# Patient Record
Sex: FEMALE | Race: WHITE | NOT HISPANIC OR LATINO
[De-identification: names, ages, dates, MRNs, and addresses within clinical notes are randomized per-mention and may not be internally consistent; named-entity substitution may affect disease eponyms.]

---

## 2023-07-05 ENCOUNTER — DASHBOARD ENCOUNTERS (OUTPATIENT)
Age: 43
End: 2023-07-05

## 2023-07-05 ENCOUNTER — WEB ENCOUNTER (OUTPATIENT)
Dept: URBAN - METROPOLITAN AREA CLINIC 9 | Facility: CLINIC | Age: 43
End: 2023-07-05

## 2023-07-05 ENCOUNTER — TELEPHONE ENCOUNTER (OUTPATIENT)
Dept: URBAN - METROPOLITAN AREA CLINIC 9 | Facility: CLINIC | Age: 43
End: 2023-07-05

## 2023-07-05 ENCOUNTER — OFFICE VISIT (OUTPATIENT)
Dept: URBAN - METROPOLITAN AREA CLINIC 9 | Facility: CLINIC | Age: 43
End: 2023-07-05
Payer: COMMERCIAL

## 2023-07-05 VITALS
BODY MASS INDEX: 27.6 KG/M2 | HEIGHT: 62 IN | WEIGHT: 150 LBS | DIASTOLIC BLOOD PRESSURE: 70 MMHG | SYSTOLIC BLOOD PRESSURE: 110 MMHG

## 2023-07-05 DIAGNOSIS — K50.90 CROHN'S DISEASE, UNSPECIFIED, WITHOUT COMPLICATIONS: ICD-10-CM

## 2023-07-05 PROCEDURE — 99204 OFFICE O/P NEW MOD 45 MIN: CPT | Performed by: INTERNAL MEDICINE

## 2023-07-05 RX ORDER — SODIUM, POTASSIUM,MAG SULFATES 17.5-3.13G
177ML SOLUTION, RECONSTITUTED, ORAL ORAL
Qty: 1 | OUTPATIENT
Start: 2023-06-26 | End: 2023-06-28

## 2023-07-05 NOTE — HPI-TODAY'S VISIT:
42 year old female with hx of crohns here to establish care. Normal recent labs 6/2023 and normal ct scan 8/2022 She is here to establish care she moved from [place].  She says she was diagnosed with what sounds like ileal Crohn's in 2007 with abdominal pain diarrhea blood and weight loss.  She had a colonoscopy at that time.  She was put on Remicade she says as well as other meds.  But she had kind of recurrent partial small bowel obstructions and eventually had a small bowel obstruction requiring ileocolonic resection in May 2009.  Then she has been on no meds since that time.  She has not been very compliant.  She also has overlap IBS-D with intermittent loose stools about 3 times a week and abdominal pain associated with that and the other days she has normal stool.  She has been gaining weight she is never really had blood is the main symptom.  Her grandmother did have colon cancer in her half sister does have Crohn's.  Sounds like the only biologic she was exposed to was Remicade.  She has had scopes since her resection in 2009 but she does not remember the last 1

## 2023-07-06 ENCOUNTER — WEB ENCOUNTER (OUTPATIENT)
Dept: URBAN - METROPOLITAN AREA CLINIC 9 | Facility: CLINIC | Age: 43
End: 2023-07-06

## 2023-07-07 ENCOUNTER — WEB ENCOUNTER (OUTPATIENT)
Dept: URBAN - METROPOLITAN AREA CLINIC 9 | Facility: CLINIC | Age: 43
End: 2023-07-07

## 2023-07-07 RX ORDER — SOD SULF/POT CHLORIDE/MAG SULF 1.479 G
AS DIRECTED TABLET ORAL 1
Qty: 1 | Refills: 0 | OUTPATIENT
Start: 2023-07-10 | End: 2023-07-12

## 2023-07-21 ENCOUNTER — OFFICE VISIT (OUTPATIENT)
Dept: URBAN - METROPOLITAN AREA SURGERY CENTER 9 | Facility: SURGERY CENTER | Age: 43
End: 2023-07-21

## 2023-07-25 LAB
ALBUMIN/GLOBULIN RATIO: 1.5
ALBUMIN: 4.1
ALKALINE PHOSPHATASE: 49
ALT: 9
AST: 14
BILIRUBIN, TOTAL: 0.6
BUN/CREATININE RATIO: 9
CALCIUM: 9.1
CARBON DIOXIDE: 29
CHLORIDE: 105
CREATININE: 0.64
GLOBULIN: 2.8
GLUCOSE: 95
POTASSIUM: 4.2
PROTEIN, TOTAL: 6.9
SODIUM: 141
UREA NITROGEN (BUN): 6

## 2023-07-27 LAB
% SATURATION: 20
ABSOLUTE BASOPHILS: 80
ABSOLUTE EOSINOPHILS: 189
ABSOLUTE LYMPHOCYTES: 1428
ABSOLUTE MONOCYTES: 407
ABSOLUTE NEUTROPHILS: 2096
BASOPHILS: 1.9
EOSINOPHILS: 4.5
FERRITIN: 7
HEMATOCRIT: 39.1
HEMOGLOBIN: 13.3
HEPATITIS A AB, TOTAL: (no result)
HEPATITIS B CORE AB TOTAL: (no result)
HEPATITIS B SURFACE AB IMMUNITY, QN: 873
HEPATITIS B SURFACE ANTIGEN: (no result)
IRON BINDING CAPACITY: 389
IRON, TOTAL: 79
LYMPHOCYTES: 34
MCH: 30
MCHC: 34
MCV: 88.3
MITOGEN-NIL: >10
MONOCYTES: 9.7
MPV: 10.2
NEUTROPHILS: 49.9
PLATELET COUNT: 255
QUANTIFERON NIL VALUE: 0.01
QUANTIFERON TB1 AG VALUE: 0.01
QUANTIFERON TB2 AG VALUE: 0
QUANTIFERON-TB GOLD PLUS: NEGATIVE
RDW: 12.4
RED BLOOD CELL COUNT: 4.43
VARICELLA ZOSTER VIRUS: 1757
VITAMIN D,25-OH,TOTAL,IA: 52
WHITE BLOOD CELL COUNT: 4.2

## 2023-08-01 LAB — CALPROTECTIN, FECAL: 16

## 2023-08-28 ENCOUNTER — CLAIMS CREATED FROM THE CLAIM WINDOW (OUTPATIENT)
Dept: URBAN - METROPOLITAN AREA SURGERY CENTER 9 | Facility: SURGERY CENTER | Age: 43
End: 2023-08-28
Payer: COMMERCIAL

## 2023-08-28 ENCOUNTER — CLAIMS CREATED FROM THE CLAIM WINDOW (OUTPATIENT)
Dept: URBAN - METROPOLITAN AREA CLINIC 4 | Facility: CLINIC | Age: 43
End: 2023-08-28
Payer: COMMERCIAL

## 2023-08-28 DIAGNOSIS — K63.89 OTHER SPECIFIED DISEASES OF INTESTINE: ICD-10-CM

## 2023-08-28 DIAGNOSIS — K50.00 CROHN''S DISEASE OF SMALL INTESTINE WITHOUT COMPLICATION: ICD-10-CM

## 2023-08-28 DIAGNOSIS — K31.89 OTHER DISEASES OF STOMACH AND DUODENUM: ICD-10-CM

## 2023-08-28 PROCEDURE — 00811 ANES LWR INTST NDSC NOS: CPT | Performed by: NURSE ANESTHETIST, CERTIFIED REGISTERED

## 2023-08-28 PROCEDURE — 88305 TISSUE EXAM BY PATHOLOGIST: CPT | Performed by: PATHOLOGY

## 2023-08-28 PROCEDURE — 45380 COLONOSCOPY AND BIOPSY: CPT | Performed by: INTERNAL MEDICINE
